# Patient Record
Sex: MALE | Race: ASIAN
[De-identification: names, ages, dates, MRNs, and addresses within clinical notes are randomized per-mention and may not be internally consistent; named-entity substitution may affect disease eponyms.]

---

## 2021-08-11 ENCOUNTER — NURSE TRIAGE (OUTPATIENT)
Dept: OTHER | Facility: CLINIC | Age: 31
End: 2021-08-11

## 2021-08-11 NOTE — TELEPHONE ENCOUNTER
Reason for Disposition   General information question, no triage required and triager able to answer question    Answer Assessment - Initial Assessment Questions  1. REASON FOR CALL or QUESTION: \"What is your reason for calling today? \" or \"How can I best help you? \" or \"What question do you have that I can help answer? \"      Caller was plugging in computer and it sparked turing his Right hand index finger black. Caller denies any shock, burn or pain. Occurred at [phone] today. Protocols used: INFORMATION ONLY CALL - NO TRIAGE-ADULT-    Location of employment: [employer]    Location of injury (body part involved): Right index finger    Time of injury: 1602    Last 4 of SSN: [id number]    Triage indicates for caller to home care    Caller directed to home care    Care advice provided, caller verbalizes understanding; denies any other questions or concerns.

## 2021-09-09 LAB
CHOLEST SERPL-MCNC: 127 MG/DL
GLUCOSE SERPL-MCNC: 117 MG/DL (ref 74–99)
HDLC SERPL-MCNC: 50 MG/DL (ref 40–60)
LDLC SERPL CALC-MCNC: 65.4 MG/DL (ref 0–100)
TRIGL SERPL-MCNC: 58 MG/DL (ref ?–150)

## 2022-04-28 LAB
CHOLEST SERPL-MCNC: 135 MG/DL
GLUCOSE SERPL-MCNC: 122 MG/DL (ref 74–99)
HDLC SERPL-MCNC: 51 MG/DL (ref 40–60)
LDLC SERPL CALC-MCNC: 64.4 MG/DL (ref 0–100)
TRIGL SERPL-MCNC: 98 MG/DL (ref ?–150)

## 2025-07-07 LAB
CHOLEST SERPL-MCNC: 153 MG/DL
GLUCOSE SERPL-MCNC: 128 MG/DL (ref 74–108)
HBA1C MFR BLD: 6.7 % (ref 4.2–5.6)
HDLC SERPL-MCNC: 60 MG/DL (ref 40–60)
LDLC SERPL CALC-MCNC: 82 MG/DL (ref 0–100)
TRIGL SERPL-MCNC: 55 MG/DL (ref 0–150)